# Patient Record
Sex: MALE | Race: WHITE | Employment: STUDENT | URBAN - NONMETROPOLITAN AREA
[De-identification: names, ages, dates, MRNs, and addresses within clinical notes are randomized per-mention and may not be internally consistent; named-entity substitution may affect disease eponyms.]

---
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
## 2023-07-03 ENCOUNTER — HOSPITAL ENCOUNTER (EMERGENCY)
Age: 18
Discharge: HOME | End: 2023-07-03
Payer: COMMERCIAL

## 2023-07-03 VITALS
SYSTOLIC BLOOD PRESSURE: 133 MMHG | RESPIRATION RATE: 16 BRPM | HEART RATE: 87 BPM | DIASTOLIC BLOOD PRESSURE: 85 MMHG | OXYGEN SATURATION: 98 % | TEMPERATURE: 98.8 F

## 2023-07-03 DIAGNOSIS — J02.9 VIRAL PHARYNGITIS: Primary | ICD-10-CM

## 2023-07-03 LAB — S PYO AG THROAT QL: NEGATIVE

## 2023-07-03 PROCEDURE — 99212 OFFICE O/P EST SF 10 MIN: CPT

## 2023-07-03 PROCEDURE — 87651 STREP A DNA AMP PROBE: CPT

## 2023-07-03 PROCEDURE — 99213 OFFICE O/P EST LOW 20 MIN: CPT

## 2023-07-03 ASSESSMENT — ENCOUNTER SYMPTOMS
COUGH: 0
SINUS PRESSURE: 0
SHORTNESS OF BREATH: 0
SINUS PAIN: 0
SORE THROAT: 1

## 2023-07-03 NOTE — ED PROVIDER NOTES
[address]  Urgent Care Encounter       CHIEF COMPLAINT       Chief Complaint   Patient presents with    Pharyngitis       Nurses Notes reviewed and I agree except as noted in the HPI. HISTORY OF PRESENT ILLNESS   Betito Cruz is a 19 y.o. male who presents with complaints of sore throat for the last few weeks. Patient reports that yesterday he could barley swallow. Patient reports using OTC throat spray as well as salt water gurgles. Patient denies fevers, congestion, runny nose, cough, or fatigue. The history is provided by the patient. REVIEW OF SYSTEMS     Review of Systems   Constitutional:  Negative for fatigue and fever. HENT:  Positive for sore throat. Negative for congestion, sinus pressure and sinus pain. Respiratory:  Negative for cough and shortness of breath. All other systems reviewed and are negative. PAST MEDICAL HISTORY         Diagnosis Date    Acid reflux     ADHD (attention deficit hyperactivity disorder)     Bladder disorder        SURGICALHISTORY     Patient  has a past surgical history that includes Bladder surgery. CURRENT MEDICATIONS       Previous Medications    ALBUTEROL SULFATE  (90 BASE) MCG/ACT INHALER    Inhale 2 puffs into the lungs every 4 hours as needed for Wheezing    CHOLECALCIFEROL (VITAMIN D-3) 1000 UNITS CAPS    Take 1,000 Units by mouth daily     EPINEPHRINE (EPIPEN 2-LYNDSEY) 0.3 MG/0.3ML SOAJ INJECTION    Inject 0.3 mLs into the muscle once for 1 dose Use as directed for allergic reaction    LEVOTHYROXINE (SYNTHROID) 100 MCG TABLET    Take 100 mcg by mouth Daily    METHYLPHENIDATE (RITALIN LA) 40 MG EXTENDED RELEASE CAPSULE    Take 40 mg by mouth every morning. METHYLPHENIDATE (RITALIN) 10 MG TABLET    Take 10 mg by mouth daily.  Takes in the afternoon in addition to the 40 mg in the AM    ONDANSETRON (ZOFRAN ODT) 4 MG DISINTEGRATING TABLET    Take 1 tablet by mouth 4 times daily as needed for Nausea or Vomiting

## 2023-07-03 NOTE — ED NOTES
To [facility] with complaints of sore throat for a couple of weeks. States he could barely swallow yesterday.  Swabbed for strep     Juan Mathew RN  07/03/23 4536

## 2023-07-03 NOTE — DISCHARGE INSTRUCTIONS
Tylenol and Motrin  Throat spray for discomfort  Cough drops  Drink lots of fluids  Salt water gurgles

## 2023-09-15 ENCOUNTER — HOSPITAL ENCOUNTER (OUTPATIENT)
Age: 18
Discharge: HOME | End: 2023-09-15

## 2023-09-15 LAB
BASOPHILS ABSOLUTE: 0 THOU/MM3 (ref 0–0.1)
BASOPHILS NFR BLD AUTO: 0.5 %
DEPRECATED RDW RBC AUTO: 42.7 FL (ref 35–45)
EOSINOPHIL NFR BLD AUTO: 0.7 %
EOSINOPHILS ABSOLUTE: 0.1 THOU/MM3 (ref 0–0.4)
ERYTHROCYTE [DISTWIDTH] IN BLOOD BY AUTOMATED COUNT: 13.6 % (ref 11.5–14.5)
HCT VFR BLD AUTO: 49.4 % (ref 42–52)
HGB BLD-MCNC: 15.9 GM/DL (ref 14–18)
IMM GRANULOCYTES # BLD AUTO: 0.02 THOU/MM3 (ref 0–0.07)
IMM GRANULOCYTES NFR BLD AUTO: 0.2 %
LYMPHOCYTES ABSOLUTE: 1.9 THOU/MM3 (ref 1–4.8)
LYMPHOCYTES NFR BLD AUTO: 21.5 %
MCH RBC QN AUTO: 27.8 PG (ref 26–33)
MCHC RBC AUTO-ENTMCNC: 32.2 GM/DL (ref 32.2–35.5)
MCV RBC AUTO: 86.4 FL (ref 80–94)
MONOCYTES ABSOLUTE: 0.5 THOU/MM3 (ref 0.4–1.3)
MONOCYTES NFR BLD AUTO: 6.3 %
NEUTROPHILS NFR BLD AUTO: 70.8 %
NRBC BLD AUTO-RTO: 0 /100 WBC
PLATELET # BLD AUTO: 255 THOU/MM3 (ref 130–400)
PMV BLD AUTO: 10.7 FL (ref 9.4–12.4)
RBC # BLD AUTO: 5.72 MILL/MM3 (ref 4.7–6.1)
SEGMENTED NEUTROPHILS ABSOLUTE COUNT: 6.2 THOU/MM3 (ref 1.8–7.7)
WBC # BLD AUTO: 8.7 THOU/MM3 (ref 4.8–10.8)

## 2023-11-16 ENCOUNTER — HOSPITAL ENCOUNTER (EMERGENCY)
Age: 18
Discharge: HOME | End: 2023-11-16
Payer: COMMERCIAL

## 2023-11-16 VITALS
OXYGEN SATURATION: 96 % | HEART RATE: 88 BPM | SYSTOLIC BLOOD PRESSURE: 126 MMHG | RESPIRATION RATE: 16 BRPM | DIASTOLIC BLOOD PRESSURE: 82 MMHG | HEIGHT: 66 IN | BODY MASS INDEX: 30.53 KG/M2 | WEIGHT: 190 LBS | TEMPERATURE: 98 F
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
## 2023-11-16 DIAGNOSIS — R51.9 ACUTE NONINTRACTABLE HEADACHE, UNSPECIFIED HEADACHE TYPE: ICD-10-CM

## 2023-11-16 PROCEDURE — 99213 OFFICE O/P EST LOW 20 MIN: CPT

## 2023-11-16 ASSESSMENT — PAIN - FUNCTIONAL ASSESSMENT: PAIN_FUNCTIONAL_ASSESSMENT: 0-10

## 2023-11-16 ASSESSMENT — ENCOUNTER SYMPTOMS
ABDOMINAL PAIN: 0
SHORTNESS OF BREATH: 0
PHOTOPHOBIA: 0
NAUSEA: 0
VOMITING: 1

## 2023-11-16 ASSESSMENT — PAIN SCALES - GENERAL: PAINLEVEL_OUTOF10: 4

## 2023-11-16 ASSESSMENT — PAIN DESCRIPTION - LOCATION: LOCATION: HEAD

## 2023-11-16 ASSESSMENT — PAIN DESCRIPTION - PAIN TYPE: TYPE: ACUTE PAIN

## 2024-04-21 ENCOUNTER — HOSPITAL ENCOUNTER (EMERGENCY)
Age: 19
Discharge: HOME | End: 2024-04-21
Payer: COMMERCIAL

## 2024-04-21 VITALS
WEIGHT: 190 LBS | BODY MASS INDEX: 31.65 KG/M2 | RESPIRATION RATE: 16 BRPM | HEIGHT: 65 IN | DIASTOLIC BLOOD PRESSURE: 89 MMHG | TEMPERATURE: 97.1 F | HEART RATE: 74 BPM | SYSTOLIC BLOOD PRESSURE: 130 MMHG | OXYGEN SATURATION: 97 %

## 2024-04-21 DIAGNOSIS — J40 BRONCHITIS: Primary | ICD-10-CM

## 2024-04-21 PROCEDURE — 99213 OFFICE O/P EST LOW 20 MIN: CPT | Performed by: NURSE PRACTITIONER

## 2024-04-21 PROCEDURE — 99213 OFFICE O/P EST LOW 20 MIN: CPT

## 2024-04-21 RX ORDER — PREDNISONE 20 MG/1
40 TABLET ORAL DAILY
Qty: 10 TABLET | Refills: 0 | Status: SHIPPED | OUTPATIENT
Start: 2024-04-21 | End: 2024-04-26

## 2024-04-21 RX ORDER — BENZONATATE 200 MG/1
200 CAPSULE ORAL 3 TIMES DAILY PRN
Qty: 30 CAPSULE | Refills: 0 | Status: SHIPPED | OUTPATIENT
Start: 2024-04-21 | End: 2024-04-28

## 2024-04-21 RX ORDER — AMOXICILLIN AND CLAVULANATE POTASSIUM 875; 125 MG/1; MG/1
1 TABLET, FILM COATED ORAL 2 TIMES DAILY
Qty: 14 TABLET | Refills: 0 | Status: SHIPPED | OUTPATIENT
Start: 2024-04-21 | End: 2024-04-28

## 2024-04-21 ASSESSMENT — PAIN SCALES - GENERAL: PAINLEVEL_OUTOF10: 0

## 2024-04-21 ASSESSMENT — PAIN - FUNCTIONAL ASSESSMENT: PAIN_FUNCTIONAL_ASSESSMENT: 0-10

## 2024-04-21 NOTE — ED NOTES
Discharge instructions and prescriptions reviewed with pt. Pt verbalized understanding. Pt ambulated out in stable condition.  Assessment unchanged upon discharge.     Liliane Avila RN  04/21/24 0240

## 2024-04-21 NOTE — ED TRIAGE NOTES
To room via ambulation with mother. Pt c/o  cough x 2 weeks without improvement. Using OTC mucinex without relief.

## 2024-04-21 NOTE — ED PROVIDER NOTES
TO:  Lyly Merrill MD  [address] [facility] [address] / [facility] [address]      DISCHARGE MEDICATIONS:  New Prescriptions    AMOXICILLIN-CLAVULANATE (AUGMENTIN) 875-125 MG PER TABLET    Take 1 tablet by mouth 2 times daily for 7 days    BENZONATATE (TESSALON) 200 MG CAPSULE    Take 1 capsule by mouth 3 times daily as needed for Cough    PREDNISONE (DELTASONE) 20 MG TABLET    Take 2 tablets by mouth daily for 5 days       Discontinued Medications    No medications on file       Current Discharge Medication List          ANDRZEJ Cardona - CNP    (Please note that portions of this note were completed with a voice recognition program. Efforts were made to edit the dictations but occasionally words are mis-transcribed.)           Gomez Wilkes, ANDRZEJ - CNP  04/21/24 4648

## 2024-09-29 ENCOUNTER — APPOINTMENT (OUTPATIENT)
Dept: GENERAL RADIOLOGY | Age: 19
End: 2024-09-29
Payer: COMMERCIAL

## 2024-09-29 ENCOUNTER — ANESTHESIA (OUTPATIENT)
Dept: ENDOSCOPY | Age: 19
End: 2024-09-29
Payer: COMMERCIAL

## 2024-09-29 ENCOUNTER — ANESTHESIA EVENT (OUTPATIENT)
Dept: ENDOSCOPY | Age: 19
End: 2024-09-29
Payer: COMMERCIAL

## 2024-09-29 ENCOUNTER — HOSPITAL ENCOUNTER (EMERGENCY)
Age: 19
Discharge: HOME | End: 2024-09-29
Attending: EMERGENCY MEDICINE
Payer: COMMERCIAL

## 2024-09-29 VITALS
DIASTOLIC BLOOD PRESSURE: 83 MMHG | WEIGHT: 185 LBS | RESPIRATION RATE: 17 BRPM | SYSTOLIC BLOOD PRESSURE: 138 MMHG | HEART RATE: 81 BPM | TEMPERATURE: 98.2 F | OXYGEN SATURATION: 94 % | HEIGHT: 65 IN | BODY MASS INDEX: 30.82 KG/M2

## 2024-09-29 DIAGNOSIS — R09.A2 FOREIGN BODY SENSATION IN THROAT: Primary | ICD-10-CM

## 2024-09-29 PROCEDURE — 7100000001 HC PACU RECOVERY - ADDTL 15 MIN: Performed by: INTERNAL MEDICINE

## 2024-09-29 PROCEDURE — 3700000001 HC ADD 15 MINUTES (ANESTHESIA): Performed by: INTERNAL MEDICINE

## 2024-09-29 PROCEDURE — 3700000000 HC ANESTHESIA ATTENDED CARE: Performed by: INTERNAL MEDICINE

## 2024-09-29 PROCEDURE — 71046 X-RAY EXAM CHEST 2 VIEWS: CPT

## 2024-09-29 PROCEDURE — 99214 OFFICE O/P EST MOD 30 MIN: CPT

## 2024-09-29 PROCEDURE — 3609017100 HC EGD: Performed by: INTERNAL MEDICINE

## 2024-09-29 PROCEDURE — 99215 OFFICE O/P EST HI 40 MIN: CPT

## 2024-09-29 PROCEDURE — 7100000000 HC PACU RECOVERY - FIRST 15 MIN: Performed by: INTERNAL MEDICINE

## 2024-09-29 PROCEDURE — 70360 X-RAY EXAM OF NECK: CPT

## 2024-09-29 PROCEDURE — 2500000003 HC RX 250 WO HCPCS: Performed by: REGISTERED NURSE

## 2024-09-29 PROCEDURE — 6360000002 HC RX W HCPCS: Performed by: REGISTERED NURSE

## 2024-09-29 PROCEDURE — 99282 EMERGENCY DEPT VISIT SF MDM: CPT

## 2024-09-29 RX ORDER — ROCURONIUM BROMIDE 10 MG/ML
INJECTION, SOLUTION INTRAVENOUS
Status: DISCONTINUED | OUTPATIENT
Start: 2024-09-29 | End: 2024-09-29 | Stop reason: SDUPTHER

## 2024-09-29 RX ORDER — SUCCINYLCHOLINE/SOD CL,ISO/PF 200MG/10ML
SYRINGE (ML) INTRAVENOUS
Status: DISCONTINUED | OUTPATIENT
Start: 2024-09-29 | End: 2024-09-29 | Stop reason: SDUPTHER

## 2024-09-29 RX ORDER — PROPOFOL 10 MG/ML
INJECTION, EMULSION INTRAVENOUS
Status: DISCONTINUED | OUTPATIENT
Start: 2024-09-29 | End: 2024-09-29 | Stop reason: SDUPTHER

## 2024-09-29 RX ORDER — MIDAZOLAM HYDROCHLORIDE 1 MG/ML
INJECTION INTRAMUSCULAR; INTRAVENOUS
Status: DISCONTINUED | OUTPATIENT
Start: 2024-09-29 | End: 2024-09-29 | Stop reason: SDUPTHER

## 2024-09-29 RX ORDER — LIDOCAINE HYDROCHLORIDE 20 MG/ML
INJECTION, SOLUTION EPIDURAL; INFILTRATION; INTRACAUDAL; PERINEURAL
Status: DISCONTINUED | OUTPATIENT
Start: 2024-09-29 | End: 2024-09-29 | Stop reason: SDUPTHER

## 2024-09-29 RX ORDER — ONDANSETRON 2 MG/ML
INJECTION INTRAMUSCULAR; INTRAVENOUS
Status: DISCONTINUED | OUTPATIENT
Start: 2024-09-29 | End: 2024-09-29 | Stop reason: SDUPTHER

## 2024-09-29 RX ORDER — FENTANYL CITRATE 50 UG/ML
INJECTION, SOLUTION INTRAMUSCULAR; INTRAVENOUS
Status: DISCONTINUED | OUTPATIENT
Start: 2024-09-29 | End: 2024-09-29 | Stop reason: SDUPTHER

## 2024-09-29 RX ADMIN — FENTANYL CITRATE 50 MCG: 50 INJECTION, SOLUTION INTRAMUSCULAR; INTRAVENOUS at 20:32

## 2024-09-29 RX ADMIN — ROCURONIUM BROMIDE 10 MG: 10 INJECTION INTRAVENOUS at 20:32

## 2024-09-29 RX ADMIN — LIDOCAINE HYDROCHLORIDE 80 MG: 20 INJECTION, SOLUTION EPIDURAL; INFILTRATION; INTRACAUDAL; PERINEURAL at 20:32

## 2024-09-29 RX ADMIN — MIDAZOLAM 2 MG: 1 INJECTION INTRAMUSCULAR; INTRAVENOUS at 20:30

## 2024-09-29 RX ADMIN — PROPOFOL 150 MG: 10 INJECTION, EMULSION INTRAVENOUS at 20:32

## 2024-09-29 RX ADMIN — SUGAMMADEX 200 MG: 100 INJECTION, SOLUTION INTRAVENOUS at 20:44

## 2024-09-29 RX ADMIN — Medication 120 MG: at 20:32

## 2024-09-29 RX ADMIN — FENTANYL CITRATE 50 MCG: 50 INJECTION, SOLUTION INTRAMUSCULAR; INTRAVENOUS at 20:37

## 2024-09-29 RX ADMIN — ONDANSETRON 4 MG: 2 INJECTION INTRAMUSCULAR; INTRAVENOUS at 20:35

## 2024-09-29 ASSESSMENT — PAIN DESCRIPTION - ONSET: ONSET: GRADUAL

## 2024-09-29 ASSESSMENT — PAIN DESCRIPTION - DESCRIPTORS: DESCRIPTORS: SORE

## 2024-09-29 ASSESSMENT — PAIN DESCRIPTION - FREQUENCY: FREQUENCY: INTERMITTENT

## 2024-09-29 ASSESSMENT — PAIN DESCRIPTION - LOCATION: LOCATION: THROAT

## 2024-09-29 ASSESSMENT — PAIN - FUNCTIONAL ASSESSMENT
PAIN_FUNCTIONAL_ASSESSMENT: NONE - DENIES PAIN
PAIN_FUNCTIONAL_ASSESSMENT: 0-10

## 2024-09-29 ASSESSMENT — PAIN DESCRIPTION - PAIN TYPE: TYPE: ACUTE PAIN

## 2024-09-29 ASSESSMENT — PAIN SCALES - GENERAL: PAINLEVEL_OUTOF10: 4

## 2024-09-29 NOTE — ED TRIAGE NOTES
Arrives to [facility] for the evaluation of foreign body stuck in throat.  Has been in the throat for 4 days.  Was chewing on a plastic ring off a water bottle, inhaled and swallowed the plastic.  Feels at if the throat has been scratched.  Rates sore throat pain 4/10 when swallowing.  Xrays ordered and completed, results pending.  VSS.  Reports having vomiting.  Respirations unlabored.  Mom at bedside.

## 2024-09-29 NOTE — ED NOTES
Handoff report from Faina MCGEE. Pt in bed with call light in reach. Even and unlabored respirations. Family bedside.

## 2024-09-29 NOTE — ED PROVIDER NOTES
[facility] EMERGENCY DEPT  Urgent Care Encounter      CHIEF COMPLAINT       Chief Complaint   Patient presents with    Foreign Body     Chewing on a plastic ring from water bottle and inhaled- Feels like still stuck in throat        Nurses Notes reviewed and I agree except as noted in the HPI.  HISTORY OF PRESENT ILLNESS   Betito Lawson is a 19 y.o. male who presents to urgent care with complaints of feeling as if something is in his throat.  Patient reports 4 days ago he was chewing on a plastic ring from a water bottle when he accidentally inhaled this.  Patient reports he felt to get stuck in his throat.  Reports he did vomit following although reports he did not vomit up the ring.  Patient reports he has been vomiting intermittently over the last 4 days and reports he is not feeling well at all.  Reports he can feel it in his right side of his lower throat.    REVIEW OF SYSTEMS     Review of Systems   Constitutional:  Negative for fever.   HENT:  Positive for trouble swallowing. Negative for congestion.    Respiratory:  Negative for cough and shortness of breath.    Neurological:  Negative for seizures and numbness.       PAST MEDICAL HISTORY         Diagnosis Date    Acid reflux     ADHD (attention deficit hyperactivity disorder)     Bladder disorder        SURGICAL HISTORY     Patient  has a past surgical history that includes Bladder surgery.    CURRENT MEDICATIONS       Previous Medications    ALBUTEROL SULFATE  (90 BASE) MCG/ACT INHALER    Inhale 2 puffs into the lungs every 4 hours as needed for Wheezing    CHOLECALCIFEROL (VITAMIN D-3) 1000 UNITS CAPS    Take 1,000 Units by mouth daily     EPINEPHRINE (EPIPEN 2-LYNDSEY) 0.3 MG/0.3ML SOAJ INJECTION    Inject 0.3 mLs into the muscle once for 1 dose Use as directed for allergic reaction    LEVOTHYROXINE (SYNTHROID) 100 MCG TABLET    Take 100 mcg by mouth Daily    METHYLPHENIDATE (RITALIN LA) 40 MG EXTENDED RELEASE CAPSULE    Take 40 mg by mouth every 
Status: Full      Summary of Patient Presentation:      MDM  /   Vitals Reviewed:    Vitals:    09/29/24 2100 09/29/24 2105 09/29/24 2107 09/29/24 2110   BP: 134/76 139/73 139/73 138/83   Pulse: 84 91 87 81   Resp: 18 20 16 17   Temp:       TempSrc:       SpO2: 98% 95% 94% 94%   Weight:       Height:           The patient was seen and examined. Appropriate diagnostic testing was performed and results reviewed with the patient.      The results of pertinent diagnostic studies and exam findings were discussed. The patient’s provisional diagnosis and plan of care were discussed with the patient and present family who expressed understanding. Any medications were reviewed and indications and risks of medications were discussed with the patient /family present. Strict verbal and written return precautions, instructions and appropriate follow-up provided to  the patient .     ED Medications administered this visit:  (None if blank)  Medications - No data to display            DIAGNOSTIC RESULTS     EKG: All EKG's are interpreted by the Emergency Department Physician who either signs or Co-signs this chart in the absence of a cardiologist.      RADIOLOGY: non-plain film images(s) such as CT, Ultrasound and MRI are read by the radiologist.  Plain radiographic images are visualized and preliminarily interpreted by the emergency physician unless otherwise stated below.      LABS:   Labs Reviewed - No data to display    EMERGENCY DEPARTMENT COURSE:   Vitals:    Vitals:    09/29/24 2100 09/29/24 2105 09/29/24 2107 09/29/24 2110   BP: 134/76 139/73 139/73 138/83   Pulse: 84 91 87 81   Resp: 18 20 16 17   Temp:       TempSrc:       SpO2: 98% 95% 94% 94%   Weight:       Height:             CRITICAL CARE:       CONSULTS:  None    PROCEDURES:  none    FINAL IMPRESSION      1. Foreign body sensation in throat          DISPOSITION/PLAN   Decision To Discharge    PATIENT REFERRED TO:  Aaron Caceres MD  [address] [place] [address]

## 2024-09-29 NOTE — ED NOTES
Pt to ED for eval after swallowing a plastic piece to a water bottle 4 days ago. Pt in stable condition. Respirations even and unlabored.

## 2024-09-30 NOTE — DISCHARGE INSTRUCTIONS
Resume regular diet take over-the-counter omeprazole 20 mg daily for a week follow-up with GI clinic as needed.

## 2024-09-30 NOTE — OP NOTE
[address] [address]                            OPERATIVE REPORT      PATIENT NAME: BRITT CROWELL            : 2005  MED REC NO: [id number]                       ROOM: 10  ACCOUNT NO: [id number]                       ADMIT DATE: 2024  PROVIDER: Aaron Caceres MD      DATE OF PROCEDURE:  2024    SURGEON:  Aaron Caceres MD    The patient is a 19-year-old male. He was chewing some plastic 4 days ago and he swallowed it.  He feels that in the throat.  He is able to eat.  He is not able to eat a lot.  He said he vomits, has discomfort.  Has felt it was stuck in his throat and he was not able to clear it, with possibility of foreign body impaction.  Plan today for EGD to evaluate this patient's to rule out foreign body impaction and to remove.    DESCRIPTION OF PROCEDURE:  The patient was brought to GI lab, consent obtained.  Risks involved with the procedure explained to the patient.  Informed consent obtained. The patient monitored during procedure with pulse oximetry, blood pressure monitoring, oxygen by nasal cannula. Sedation via general anesthesia by Anesthesia Service.  The patient was intubated.  For medications given during the procedure, please see the Anesthesia notes.    Placed on intubation.  The patient positioned the left decubitus position.  Mouthpiece put to keep the oral cavity open.      Procedure performed, EGD:  Upper scope advanced without difficulty up to the duodenum.  The esophagus showed slight irritation of the esophagus consistent with moderate esophageal reflux, possible trauma from foreign body which was basically passed by itself.  Scope was advanced to the stomach.  A lot of washing done. No foreign body seen.  The stomach was completely empty, showed the duodenum which appears normal. I advanced up to the third part of the duodenum, but did not any foreign body. No biopsy obtained. The scope

## 2024-09-30 NOTE — CONSULTS
[place] [address]                              CONSULTATION      PATIENT NAME: BRITT CROWELL            : 2005  MED REC NO: [id number]                       ROOM: 10  ACCOUNT NO: [id number]                       ADMIT DATE: 2024  PROVIDER: Aaron Caceres MD    GI CONSULT    CONSULT DATE: 2024      HISTORY OF PRESENT ILLNESS:  The patient with food impaction of the esophagus or foreign body in the esophagus, feeling something in the throat, not able to clear, severe discomfort in the epigastric area, came to the ER for evaluation.  He was told that piece of plastic basically part of the cap of the plastic bottle he swallowed.  Since then, he has been having some nausea, irritation of the oral cavity and throat. Plan is to see him in the ER for evaluation for possible EGD.    He denies fever or chills.  He is not short of breath, having no chest pain.  No diaphoresis or palpitation.    PAST MEDICAL HISTORY:  Attention deficit disorder, asthma as a child, anxiety, gastroesophageal reflux, gallbladder disorder.    PAST SURGICAL HISTORY:  Bladder surgery.    SOCIAL HISTORY:  He has no smoking.  No smokeless tobacco.  No alcohol abuse.  No drug abuse.    FAMILY HISTORY:  Include diabetes, heart disease, hypertension, hypercalcemia, kidney disease, CVA in his paternal grandfather.    ALLERGIES:  NONE.      MEDICATIONS:  He is taking no medication .    PHYSICAL EXAMINATION:  GENERAL:  Very healthy pleasant young man.  VITAL SIGNS:  Blood pressure 139/96, respirations 18, heart rate 70, his weight is 185 pounds.  HEENT:  His head is atraumatic.  Sclerae anicteric.  His pupils are round and reactive to light and accommodation with normal extraocular muscle movements.  Conjunctivae normal.  His oral cavity normal with no lesion.  NECK:  Supple.  CHEST:  Normal.  CARDIOVASCULAR:  Normal.  ABDOMEN:  Soft.  No tenderness.  No

## 2024-09-30 NOTE — PROGRESS NOTES
EGD complete, photos taken, no specimens taken pt tolerated procedure well    No foreign body seen or removed  Esophagus irritated       Scope Number [id number].      Pt taken to pacu and report given to pacu  Rn

## 2024-09-30 NOTE — PROGRESS NOTES
2054  - pt arrives to pacu, respirations unlabored on 6 L simple mask, pt denies pain, VSS    2100 - pt resting comfortably, pt denies pain    2105 -report called to Ammy MCGEE    2110 - pt meets criteria for discharge from pacu at this time per Dr. Almodovar, pt transported to ED room 10 in stable condition

## 2024-09-30 NOTE — ANESTHESIA PRE PROCEDURE
Department of Anesthesiology  Preprocedure Note       Name:  Betito Lawson   Age:  19 y.o.  :  2005                                          MRN:  [id number]         Date:  2024      Surgeon: Surgeon(s):  Aaron Caceres MD    Procedure: Procedure(s):  ESOPHAGOGASTRODUODENOSCOPY    Medications prior to admission:   Prior to Admission medications    Medication Sig Start Date End Date Taking? Authorizing Provider   ondansetron (ZOFRAN ODT) 4 MG disintegrating tablet Take 1 tablet by mouth 4 times daily as needed for Nausea or Vomiting (Dissolve on tongue 4 times daily for nausea vomiting)  Patient not taking: Reported on 2024   Jose Grimes MD   methylphenidate (RITALIN LA) 40 MG extended release capsule Take 40 mg by mouth every morning.  Patient not taking: Reported on 2024    Glory Castillo MD   methylphenidate (RITALIN) 10 MG tablet Take 10 mg by mouth daily. Takes in the afternoon in addition to the 40 mg in the AM  Patient not taking: Reported on 2024    Glory Castillo MD   levothyroxine (SYNTHROID) 100 MCG tablet Take 100 mcg by mouth Daily    Glory Castillo MD   naproxen (NAPROSYN) 500 MG tablet Take 1 tablet by mouth 2 times daily (with meals) for 20 doses 3/10/22 5/8/22  Jose Grimes MD   EPINEPHrine (EPIPEN 2-LYNDSEY) 0.3 MG/0.3ML SOAJ injection Inject 0.3 mLs into the muscle once for 1 dose Use as directed for allergic reaction 2/11/21 6/15/22  José Miguel Arceo DO   ibuprofen (ADVIL;MOTRIN) 600 MG tablet Take 1 tablet by mouth 4 times daily as needed for Pain 21  Bennett Case APRN - CNP   albuterol sulfate  (90 Base) MCG/ACT inhaler Inhale 2 puffs into the lungs every 4 hours as needed for Wheezing 7/25/19 6/15/22  Shen Miles PA-C   Cholecalciferol (VITAMIN D-3) 1000 UNITS CAPS Take 1,000 Units by mouth daily   Patient not taking: Reported on 2024    Glory Castillo MD   oxybutynin (DITROPAN XL) 5 MG CR

## 2024-09-30 NOTE — H&P
(Refer to nursing sedation/analgesia documentation record)  [x]Formulation and discussion of sedation/procedure plan, risks, and expectations with patient and/or responsible adult completed.  [x]Patient examined immediately prior to the procedure. (Refer to nursing sedation/analgesia documentation record)    Aaron Caceres MD, MD   Electronically signed 9/29/2024 at 8:27 PM

## 2024-09-30 NOTE — ANESTHESIA POSTPROCEDURE EVALUATION
Department of Anesthesiology  Postprocedure Note    Patient: Betito Lawson  MRN: [id number]  YOB: 2005  Date of evaluation: 9/30/2024    Procedure Summary       Date: 09/29/24 Room / Location: [place] [address] / [facility]    Anesthesia Start: 2024 Anesthesia Stop: 2059    Procedure: ESOPHAGOGASTRODUODENOSCOPY Diagnosis:       Foreign body in esophagus, initial encounter      (Foreign body in esophagus, initial encounter [T18.108A])    Surgeons: Aaron Caceres MD Responsible Provider: Danilo Mehta MD    Anesthesia Type: general ASA Status: 2 - Emergent            Anesthesia Type: No value filed.    Aneesh Phase I: Aneesh Score: 10    Aneesh Phase II:      Anesthesia Post Evaluation    Patient location during evaluation: PACU  Patient participation: complete - patient participated  Level of consciousness: awake and alert  Airway patency: patent  Nausea & Vomiting: no nausea and no vomiting  Cardiovascular status: hemodynamically stable  Respiratory status: acceptable  Hydration status: euvolemic  Pain management: adequate    [facility]  POST-ANESTHESIA NOTE       Name:  Betito Lawson                                         Age:  19 y.o.  MRN:  [id number]      Last Vitals:  /83   Pulse 81   Temp 98.2 °F (36.8 °C) (Temporal)   Resp 17   Ht 1.651 m (5' 5\")   Wt 83.9 kg (185 lb)   SpO2 94%   BMI 30.79 kg/m²   No data found.    Level of Consciousness:  Awake    Respiratory:  Stable    Oxygen Saturation:  Stable    Cardiovascular:  Stable    Hydration:  Adequate    PONV:  Stable    Post-op Pain:  Adequate analgesia    Post-op Assessment:  No apparent anesthetic complications    Additional Follow-Up / Treatment / Comment:  None    DANILO MEHTA MD  September 30, 2024   7:01 AM      No notable events documented.

## 2024-10-07 ENCOUNTER — HOSPITAL ENCOUNTER (OUTPATIENT)
Age: 19
Discharge: HOME | End: 2024-10-07

## 2024-10-07 LAB
BASOPHILS ABSOLUTE: 0 THOU/MM3 (ref 0–0.1)
BASOPHILS NFR BLD AUTO: 0.5 %
DEPRECATED RDW RBC AUTO: 41.1 FL (ref 35–45)
EOSINOPHIL NFR BLD AUTO: 1.9 %
EOSINOPHILS ABSOLUTE: 0.1 THOU/MM3 (ref 0–0.4)
ERYTHROCYTE [DISTWIDTH] IN BLOOD BY AUTOMATED COUNT: 13.2 % (ref 11.5–14.5)
HCT VFR BLD AUTO: 50.8 % (ref 42–52)
HGB BLD-MCNC: 17 GM/DL (ref 14–18)
IMM GRANULOCYTES # BLD AUTO: 0.01 THOU/MM3 (ref 0–0.07)
IMM GRANULOCYTES NFR BLD AUTO: 0.2 %
LYMPHOCYTES ABSOLUTE: 2.4 THOU/MM3 (ref 1–4.8)
LYMPHOCYTES NFR BLD AUTO: 38.6 %
MCH RBC QN AUTO: 28.6 PG (ref 26–33)
MCHC RBC AUTO-ENTMCNC: 33.5 GM/DL (ref 32.2–35.5)
MCV RBC AUTO: 85.5 FL (ref 80–94)
MONOCYTES ABSOLUTE: 0.5 THOU/MM3 (ref 0.4–1.3)
MONOCYTES NFR BLD AUTO: 8.3 %
NEUTROPHILS ABSOLUTE: 3.2 THOU/MM3 (ref 1.8–7.7)
NEUTROPHILS NFR BLD AUTO: 50.5 %
NRBC BLD AUTO-RTO: 0 /100 WBC
PLATELET # BLD AUTO: 269 THOU/MM3 (ref 130–400)
PMV BLD AUTO: 10.7 FL (ref 9.4–12.4)
RBC # BLD AUTO: 5.94 MILL/MM3 (ref 4.7–6.1)
WBC # BLD AUTO: 6.3 THOU/MM3 (ref 4.8–10.8)

## 2025-03-12 ENCOUNTER — OFFICE VISIT (OUTPATIENT)
Dept: FAMILY MEDICINE CLINIC | Age: 20
End: 2025-03-12
Payer: COMMERCIAL

## 2025-03-12 VITALS
OXYGEN SATURATION: 97 % | SYSTOLIC BLOOD PRESSURE: 128 MMHG | TEMPERATURE: 97.6 F | DIASTOLIC BLOOD PRESSURE: 88 MMHG | HEART RATE: 87 BPM | WEIGHT: 200.8 LBS | BODY MASS INDEX: 33.45 KG/M2 | RESPIRATION RATE: 12 BRPM | HEIGHT: 65 IN

## 2025-03-12 DIAGNOSIS — E03.9 HYPOTHYROIDISM (ACQUIRED): ICD-10-CM

## 2025-03-12 DIAGNOSIS — E66.09 CLASS 1 OBESITY DUE TO EXCESS CALORIES WITHOUT SERIOUS COMORBIDITY WITH BODY MASS INDEX (BMI) OF 33.0 TO 33.9 IN ADULT: ICD-10-CM

## 2025-03-12 DIAGNOSIS — Z76.89 ENCOUNTER TO ESTABLISH CARE: Primary | ICD-10-CM

## 2025-03-12 DIAGNOSIS — Z91.030 BEE STING ALLERGY: ICD-10-CM

## 2025-03-12 DIAGNOSIS — R53.83 FATIGUE, UNSPECIFIED TYPE: ICD-10-CM

## 2025-03-12 DIAGNOSIS — E66.811 CLASS 1 OBESITY DUE TO EXCESS CALORIES WITHOUT SERIOUS COMORBIDITY WITH BODY MASS INDEX (BMI) OF 33.0 TO 33.9 IN ADULT: ICD-10-CM

## 2025-03-12 PROCEDURE — 99204 OFFICE O/P NEW MOD 45 MIN: CPT | Performed by: NURSE PRACTITIONER

## 2025-03-12 RX ORDER — EPINEPHRINE 0.3 MG/.3ML
0.3 INJECTION SUBCUTANEOUS ONCE
Qty: 0.3 ML | Refills: 0 | Status: SHIPPED | OUTPATIENT
Start: 2025-03-12 | End: 2025-03-12

## 2025-03-12 SDOH — ECONOMIC STABILITY: FOOD INSECURITY: WITHIN THE PAST 12 MONTHS, YOU WORRIED THAT YOUR FOOD WOULD RUN OUT BEFORE YOU GOT MONEY TO BUY MORE.: NEVER TRUE

## 2025-03-12 SDOH — ECONOMIC STABILITY: FOOD INSECURITY: WITHIN THE PAST 12 MONTHS, THE FOOD YOU BOUGHT JUST DIDN'T LAST AND YOU DIDN'T HAVE MONEY TO GET MORE.: NEVER TRUE

## 2025-03-12 ASSESSMENT — PATIENT HEALTH QUESTIONNAIRE - PHQ9
1. LITTLE INTEREST OR PLEASURE IN DOING THINGS: NOT AT ALL
SUM OF ALL RESPONSES TO PHQ QUESTIONS 1-9: 0
2. FEELING DOWN, DEPRESSED OR HOPELESS: NOT AT ALL
SUM OF ALL RESPONSES TO PHQ QUESTIONS 1-9: 0

## 2025-03-12 NOTE — PROGRESS NOTES
[facility]  [address] [place] [address]  Chief Complaint   Patient presents with    New Patient     Wanting to get blood work done for his thyroid. Use to be on thyroid medication but stopped this. Also wanting testosterone checked. Has not seen a provider since  theodora       History obtained from chart review and the patient.    SUBJECTIVE:  Betito Lawson is a 20 y.o. male that presents today for establishing care with new physician, etc. New patient, 1st time visit to [facility] @ [facility].    Hypothyroidism    HPI:  Currently treated for Hypothyroidism?  No, he stopped his thyroid meds for at least 4 years. Was previously seeing endocrinologist at [facility]. 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  Fatigue?  Yes  Recent change in weight?  Yes - having a hard time losing  Cold/Heat intolerance?  No  Diarrhea/Constipation?  No  Diaphoresis?  Yes  Anxiety?  No  Palpitations?  No   Hair Loss?  No    Would also like to get his testosterone level checked  He does lift weights  Sex drive is good, denies any ED issues    Bee Sting Allergy  Had hives, SOB after bee sting-had to go to the hospital  He does have Epipen but it is     Age/Gender Health Maintenance    Lipid - 40  DM Screen - 40  Colon Cancer Screening - 45  Lung Cancer Screening (Age 55 to 80 with 30 pack year hx, current smoker or quit within past 15 years) - n/a    Tetanus - every 10 years  Influenza Vaccine - yearly  Pneumonia Vaccine - 65  Zostavax - 50   HPV Vaccine - completed    PSA testing discussion - 55  AAA Screening - 65      Current Outpatient Medications   Medication Sig Dispense Refill    EPINEPHrine (EPIPEN 2-LYNDSEY) 0.3 MG/0.3ML SOAJ injection Inject 0.3 mLs into the muscle once for 1 dose Use as directed for allergic reaction 0.3 mL 0    albuterol sulfate  (90 Base) MCG/ACT inhaler Inhale 2 puffs into the lungs every 4 hours as needed for Wheezing 1 Inhaler 0

## 2025-03-27 ENCOUNTER — LAB (OUTPATIENT)
Dept: LAB | Age: 20
End: 2025-03-27

## 2025-03-27 DIAGNOSIS — E03.9 HYPOTHYROIDISM (ACQUIRED): ICD-10-CM

## 2025-03-27 DIAGNOSIS — E66.811 CLASS 1 OBESITY DUE TO EXCESS CALORIES WITHOUT SERIOUS COMORBIDITY WITH BODY MASS INDEX (BMI) OF 33.0 TO 33.9 IN ADULT: ICD-10-CM

## 2025-03-27 DIAGNOSIS — E66.09 CLASS 1 OBESITY DUE TO EXCESS CALORIES WITHOUT SERIOUS COMORBIDITY WITH BODY MASS INDEX (BMI) OF 33.0 TO 33.9 IN ADULT: ICD-10-CM

## 2025-03-27 DIAGNOSIS — R53.83 FATIGUE, UNSPECIFIED TYPE: ICD-10-CM

## 2025-03-27 LAB
ALBUMIN SERPL BCG-MCNC: 4.4 G/DL (ref 3.4–4.9)
ALP SERPL-CCNC: 72 U/L (ref 40–129)
ALT SERPL W/O P-5'-P-CCNC: 72 U/L (ref 10–50)
ANION GAP SERPL CALC-SCNC: 12 MEQ/L (ref 8–16)
AST SERPL-CCNC: 43 U/L (ref 10–50)
BASOPHILS ABSOLUTE: 0.1 THOU/MM3 (ref 0–0.1)
BASOPHILS NFR BLD AUTO: 0.9 %
BILIRUB SERPL-MCNC: 0.5 MG/DL (ref 0.3–1.2)
BUN SERPL-MCNC: 19 MG/DL (ref 8–23)
CALCIUM SERPL-MCNC: 9.7 MG/DL (ref 8.6–10)
CHLORIDE SERPL-SCNC: 104 MEQ/L (ref 98–111)
CHOLESTEROL, FASTING: 171 MG/DL (ref 100–199)
CO2 SERPL-SCNC: 24 MEQ/L (ref 22–29)
CREAT SERPL-MCNC: 1.2 MG/DL (ref 0.7–1.2)
DEPRECATED MEAN GLUCOSE BLD GHB EST-ACNC: 102 MG/DL (ref 70–126)
DEPRECATED RDW RBC AUTO: 40.4 FL (ref 35–45)
EOSINOPHIL NFR BLD AUTO: 3.2 %
EOSINOPHILS ABSOLUTE: 0.2 THOU/MM3 (ref 0–0.4)
ERYTHROCYTE [DISTWIDTH] IN BLOOD BY AUTOMATED COUNT: 13.5 % (ref 11.5–14.5)
GFR SERPL CREATININE-BSD FRML MDRD: 88 ML/MIN/1.73M2
GLUCOSE SERPL-MCNC: 94 MG/DL (ref 74–109)
HBA1C MFR BLD HPLC: 5.4 % (ref 4–6)
HCT VFR BLD AUTO: 49.2 % (ref 42–52)
HDLC SERPL-MCNC: 40 MG/DL
HGB BLD-MCNC: 16.9 GM/DL (ref 14–18)
IMM GRANULOCYTES # BLD AUTO: 0.01 THOU/MM3 (ref 0–0.07)
IMM GRANULOCYTES NFR BLD AUTO: 0.1 %
LDLC SERPL CALC-MCNC: 119 MG/DL
LYMPHOCYTES ABSOLUTE: 2.4 THOU/MM3 (ref 1–4.8)
LYMPHOCYTES NFR BLD AUTO: 35 %
MCH RBC QN AUTO: 28.3 PG (ref 26–33)
MCHC RBC AUTO-ENTMCNC: 34.3 GM/DL (ref 32.2–35.5)
MCV RBC AUTO: 82.4 FL (ref 80–94)
MONOCYTES ABSOLUTE: 0.7 THOU/MM3 (ref 0.4–1.3)
MONOCYTES NFR BLD AUTO: 9.5 %
NEUTROPHILS ABSOLUTE: 3.5 THOU/MM3 (ref 1.8–7.7)
NEUTROPHILS NFR BLD AUTO: 51.3 %
NRBC BLD AUTO-RTO: 0 /100 WBC
PLATELET # BLD AUTO: 254 THOU/MM3 (ref 130–400)
PMV BLD AUTO: 10.3 FL (ref 9.4–12.4)
POTASSIUM SERPL-SCNC: 4.3 MEQ/L (ref 3.5–5.2)
PROT SERPL-MCNC: 7.1 G/DL (ref 6.4–8.3)
RBC # BLD AUTO: 5.97 MILL/MM3 (ref 4.7–6.1)
SHBG SERPL-SCNC: 14 NMOL/L (ref 17–56)
SODIUM SERPL-SCNC: 140 MEQ/L (ref 135–145)
T4 FREE SERPL-MCNC: 0.8 NG/DL (ref 0.92–1.68)
TESTOST FREE MFR SERPL: 162.1 PG/ML (ref 47–244)
TESTOST SERPL-MCNC: 525 NG/DL (ref 249–836)
TRIGLYCERIDE, FASTING: 58 MG/DL (ref 0–199)
TSH SERPL DL<=0.05 MIU/L-ACNC: 10 UIU/ML (ref 0.27–4.2)
WBC # BLD AUTO: 6.9 THOU/MM3 (ref 4.8–10.8)

## 2025-03-28 ENCOUNTER — TELEPHONE (OUTPATIENT)
Dept: FAMILY MEDICINE CLINIC | Age: 20
End: 2025-03-28

## 2025-03-28 ENCOUNTER — RESULTS FOLLOW-UP (OUTPATIENT)
Dept: FAMILY MEDICINE CLINIC | Age: 20
End: 2025-03-28

## 2025-03-28 DIAGNOSIS — E03.9 HYPOTHYROIDISM (ACQUIRED): Primary | ICD-10-CM

## 2025-03-28 DIAGNOSIS — R74.8 ELEVATED LIVER ENZYMES: ICD-10-CM

## 2025-03-28 RX ORDER — LEVOTHYROXINE SODIUM 25 UG/1
25 TABLET ORAL DAILY
Qty: 30 TABLET | Refills: 5 | Status: SHIPPED | OUTPATIENT
Start: 2025-03-28

## 2025-03-28 NOTE — TELEPHONE ENCOUNTER
Pt informed of results . Pt voiced understanding with no further questions at this time.     Lab slip mailed to pt